# Patient Record
Sex: FEMALE | Race: WHITE | NOT HISPANIC OR LATINO | Employment: OTHER | URBAN - METROPOLITAN AREA
[De-identification: names, ages, dates, MRNs, and addresses within clinical notes are randomized per-mention and may not be internally consistent; named-entity substitution may affect disease eponyms.]

---

## 2017-08-09 ENCOUNTER — HISTORICAL (OUTPATIENT)
Dept: ADMINISTRATIVE | Facility: HOSPITAL | Age: 82
End: 2017-08-09

## 2019-03-21 ENCOUNTER — OFFICE VISIT (OUTPATIENT)
Dept: PRIMARY CARE CLINIC | Facility: CLINIC | Age: 84
End: 2019-03-21
Payer: MEDICARE

## 2019-03-21 VITALS
RESPIRATION RATE: 16 BRPM | WEIGHT: 178 LBS | BODY MASS INDEX: 34.95 KG/M2 | HEART RATE: 55 BPM | SYSTOLIC BLOOD PRESSURE: 138 MMHG | DIASTOLIC BLOOD PRESSURE: 68 MMHG | HEIGHT: 60 IN | OXYGEN SATURATION: 95 %

## 2019-03-21 DIAGNOSIS — M54.50 CHRONIC LOW BACK PAIN WITHOUT SCIATICA, UNSPECIFIED BACK PAIN LATERALITY: ICD-10-CM

## 2019-03-21 DIAGNOSIS — I10 HYPERTENSION, UNSPECIFIED TYPE: ICD-10-CM

## 2019-03-21 DIAGNOSIS — K21.9 GASTROESOPHAGEAL REFLUX DISEASE, ESOPHAGITIS PRESENCE NOT SPECIFIED: Primary | ICD-10-CM

## 2019-03-21 DIAGNOSIS — G89.29 CHRONIC LOW BACK PAIN WITHOUT SCIATICA, UNSPECIFIED BACK PAIN LATERALITY: ICD-10-CM

## 2019-03-21 PROCEDURE — 99214 OFFICE O/P EST MOD 30 MIN: CPT | Mod: S$GLB,,, | Performed by: FAMILY MEDICINE

## 2019-03-21 PROCEDURE — 99214 PR OFFICE/OUTPT VISIT, EST, LEVL IV, 30-39 MIN: ICD-10-PCS | Mod: S$GLB,,, | Performed by: FAMILY MEDICINE

## 2019-03-21 RX ORDER — OMEPRAZOLE 40 MG/1
40 CAPSULE, DELAYED RELEASE ORAL DAILY
Qty: 90 CAPSULE | Refills: 3 | Status: SHIPPED | OUTPATIENT
Start: 2019-03-21 | End: 2020-03-20

## 2019-03-21 RX ORDER — LOSARTAN POTASSIUM 100 MG/1
TABLET ORAL
COMMUNITY
Start: 2019-02-12 | End: 2019-03-21 | Stop reason: SDUPTHER

## 2019-03-21 RX ORDER — LOSARTAN POTASSIUM 100 MG/1
TABLET ORAL
Qty: 90 TABLET | Refills: 3 | Status: SHIPPED | OUTPATIENT
Start: 2019-03-21 | End: 2019-08-21 | Stop reason: SDUPTHER

## 2019-03-21 RX ORDER — ASPIRIN 81 MG/1
TABLET ORAL
COMMUNITY

## 2019-03-21 RX ORDER — GABAPENTIN 300 MG/1
300 CAPSULE ORAL 3 TIMES DAILY
COMMUNITY

## 2019-03-21 RX ORDER — AMLODIPINE BESYLATE 10 MG/1
10 TABLET ORAL DAILY
Qty: 90 TABLET | Refills: 3 | Status: SHIPPED | OUTPATIENT
Start: 2019-03-21 | End: 2019-08-21 | Stop reason: SDUPTHER

## 2019-03-21 RX ORDER — HYDROCODONE BITARTRATE AND ACETAMINOPHEN 5; 325 MG/1; MG/1
1 TABLET ORAL EVERY 6 HOURS PRN
Qty: 60 TABLET | Refills: 0 | Status: SHIPPED | OUTPATIENT
Start: 2019-03-21 | End: 2019-06-13 | Stop reason: SDUPTHER

## 2019-03-21 RX ORDER — BISOPROLOL FUMARATE AND HYDROCHLOROTHIAZIDE 10; 6.25 MG/1; MG/1
TABLET ORAL
COMMUNITY
Start: 2018-12-26 | End: 2019-03-21 | Stop reason: SDUPTHER

## 2019-03-21 RX ORDER — BISOPROLOL FUMARATE AND HYDROCHLOROTHIAZIDE 10; 6.25 MG/1; MG/1
TABLET ORAL
Qty: 180 TABLET | Refills: 3 | Status: SHIPPED | OUTPATIENT
Start: 2019-03-21 | End: 2019-08-21 | Stop reason: SDUPTHER

## 2019-03-21 RX ORDER — HYDROCODONE BITARTRATE AND ACETAMINOPHEN 5; 325 MG/1; MG/1
1 TABLET ORAL EVERY 6 HOURS PRN
COMMUNITY
End: 2019-03-21 | Stop reason: SDUPTHER

## 2019-03-21 RX ORDER — AMLODIPINE BESYLATE 10 MG/1
10 TABLET ORAL DAILY
Refills: 3 | COMMUNITY
Start: 2019-02-22 | End: 2019-03-21 | Stop reason: SDUPTHER

## 2019-03-21 RX ORDER — ALBUTEROL SULFATE 90 UG/1
AEROSOL, METERED RESPIRATORY (INHALATION)
COMMUNITY

## 2019-03-21 RX ORDER — LORAZEPAM 1 MG/1
TABLET ORAL
COMMUNITY

## 2019-03-21 RX ORDER — TIZANIDINE HYDROCHLORIDE 4 MG/1
CAPSULE, GELATIN COATED ORAL
COMMUNITY

## 2019-03-21 RX ORDER — FLUTICASONE FUROATE AND VILANTEROL 100; 25 UG/1; UG/1
POWDER RESPIRATORY (INHALATION)
COMMUNITY

## 2019-03-21 RX ORDER — HYDROCODONE BITARTRATE AND ACETAMINOPHEN 7.5; 325 MG/1; MG/1
TABLET ORAL
COMMUNITY
End: 2019-10-29

## 2019-03-21 NOTE — PROGRESS NOTES
Subjective:       Patient ID: Swati Martin is a 84 y.o. female.    Chief Complaint: Follow-up    Pt with htn.  She has had some cramps and edema.  Her bp has been good.  This occurred when her bisoprolol/hct was switched to generic and she also started taking it bid.    Also with stomach irritation.  She had black and bloody stool yesterday.  Hx of gastric ulcer a few years ago.  She is not on a med for this right now, b/c it hasn't been bothering her until she drank pickle juice for the mucsle cramps.  Also with chronic low back pain. She takes narcotics for this on and off.  She denies any side effects.  We have risks many times.    Review of Systems   Constitutional: Negative for chills, fatigue, fever and unexpected weight change.   Eyes: Negative for visual disturbance.   Respiratory: Negative for cough, shortness of breath and wheezing.    Cardiovascular: Negative for chest pain and palpitations.   Gastrointestinal: Positive for blood in stool and nausea. Negative for abdominal pain.        Heartburn   Genitourinary: Negative for difficulty urinating and dysuria.   Musculoskeletal: Positive for back pain.   Skin: Negative for rash.   Neurological: Negative for dizziness, syncope, light-headedness, numbness and headaches.   Hematological: Negative for adenopathy.   Psychiatric/Behavioral: Negative for dysphoric mood. The patient is not nervous/anxious.        Objective:      Physical Exam   Constitutional: She is oriented to person, place, and time. She appears well-developed and well-nourished.   obese   HENT:   Head: Normocephalic and atraumatic.   Eyes: Conjunctivae and EOM are normal.   Neck: Neck supple. No thyromegaly present.   Cardiovascular: Normal rate, regular rhythm and intact distal pulses.   No murmur heard.  Pulmonary/Chest: Effort normal and breath sounds normal.   Abdominal: Soft. Bowel sounds are normal. She exhibits no mass. There is no tenderness. There is no rebound and no guarding.    Musculoskeletal: Normal range of motion.   Neurological: She is alert and oriented to person, place, and time.   Skin: Skin is dry. No rash noted.   Psychiatric: She has a normal mood and affect.   Vitals reviewed.      Assessment:       1. Gastroesophageal reflux disease, esophagitis presence not specified    2. Hypertension, unspecified type    3. Chronic low back pain without sciatica, unspecified back pain laterality        Plan:       Gastroesophageal reflux disease, esophagitis presence not specified  -     omeprazole (PRILOSEC) 40 MG capsule; Take 1 capsule (40 mg total) by mouth once daily.  Dispense: 90 capsule; Refill: 3    Hypertension, unspecified type  -     amLODIPine (NORVASC) 10 MG tablet; Take 1 tablet (10 mg total) by mouth once daily. One tablet by mouth once daily  Dispense: 90 tablet; Refill: 3  -     bisoprolol-hydrochlorothiazide (ZIAC) 10-6.25 mg per tablet; One tablet by mouth twice daily  Dispense: 180 tablet; Refill: 3  -     losartan (COZAAR) 100 MG tablet; One tablet by mouth once daily  Dispense: 90 tablet; Refill: 3    Chronic low back pain without sciatica, unspecified back pain laterality  -     HYDROcodone-acetaminophen (NORCO) 5-325 mg per tablet; Take 1 tablet by mouth every 6 (six) hours as needed for Pain.  Dispense: 60 tablet; Refill: 0              DISCUSSION:  Try decreasing the dose to qd on the bisoprolol/hct and monitor sx's and bp.  Continue to be very cautious with the narcotics.  Start the omeprazole and monitor your stools.  Go to ER if worsens acutely.  Also go to ER if any stomach pain develops.

## 2019-04-05 PROBLEM — K57.30 DIVERTICULAR DISEASE OF COLON: Status: ACTIVE | Noted: 2019-04-05

## 2019-04-05 PROBLEM — D12.6 BENIGN NEOPLASM OF COLON: Status: ACTIVE | Noted: 2019-04-05

## 2019-04-05 PROBLEM — J44.9 CHRONIC OBSTRUCTIVE LUNG DISEASE: Status: ACTIVE | Noted: 2019-04-05

## 2019-04-05 PROBLEM — R74.01 ELEVATED LEVELS OF TRANSAMINASE & LACTIC ACID DEHYDROGENASE: Status: ACTIVE | Noted: 2019-04-05

## 2019-04-05 PROBLEM — M81.0 OSTEOPOROSIS: Status: ACTIVE | Noted: 2019-04-05

## 2019-04-05 PROBLEM — F41.1 ANXIETY STATE: Status: ACTIVE | Noted: 2019-04-05

## 2019-04-05 PROBLEM — I70.91 GENERALIZED ATHEROSCLEROSIS: Status: ACTIVE | Noted: 2019-04-05

## 2019-04-05 PROBLEM — R01.1 HEART MURMUR: Status: ACTIVE | Noted: 2019-04-05

## 2019-04-05 PROBLEM — I65.29 CAROTID ARTERY OCCLUSION: Status: ACTIVE | Noted: 2019-04-05

## 2019-04-05 PROBLEM — I10 BENIGN ESSENTIAL HYPERTENSION: Status: ACTIVE | Noted: 2019-04-05

## 2019-04-05 PROBLEM — R16.1 SPLENOMEGALY: Status: ACTIVE | Noted: 2019-04-05

## 2019-04-05 PROBLEM — R74.02 ELEVATED LEVELS OF TRANSAMINASE & LACTIC ACID DEHYDROGENASE: Status: ACTIVE | Noted: 2019-04-05

## 2019-04-05 PROBLEM — M54.50 LOW BACK PAIN: Status: ACTIVE | Noted: 2019-04-05

## 2019-04-05 PROBLEM — I10 HYPERTENSIVE DISORDER: Status: ACTIVE | Noted: 2019-04-05

## 2019-04-05 PROBLEM — F32.A DEPRESSIVE DISORDER: Status: ACTIVE | Noted: 2019-04-05

## 2019-04-05 PROBLEM — M19.90 OSTEOARTHRITIS: Status: ACTIVE | Noted: 2019-04-05

## 2019-06-13 DIAGNOSIS — G89.29 CHRONIC LOW BACK PAIN WITHOUT SCIATICA, UNSPECIFIED BACK PAIN LATERALITY: ICD-10-CM

## 2019-06-13 DIAGNOSIS — M54.50 CHRONIC LOW BACK PAIN WITHOUT SCIATICA, UNSPECIFIED BACK PAIN LATERALITY: ICD-10-CM

## 2019-06-13 NOTE — TELEPHONE ENCOUNTER
Pt called and also came to office stating she is needing a refill on her Hydrocodone/APAP 5/325MG tab - 1 P O Q 6 hours prn pain. Pt currently only has 2 left.   trop negative x 2, patient continues to feels well, denies recurrent pain. Will follow up with PMD and with pulm Dr. Teresa for chronic dyspnea.

## 2019-06-17 RX ORDER — HYDROCODONE BITARTRATE AND ACETAMINOPHEN 5; 325 MG/1; MG/1
1 TABLET ORAL EVERY 6 HOURS PRN
Qty: 60 TABLET | Refills: 0 | Status: SHIPPED | OUTPATIENT
Start: 2019-06-17 | End: 2019-08-21 | Stop reason: SDUPTHER

## 2019-08-21 ENCOUNTER — OFFICE VISIT (OUTPATIENT)
Dept: PRIMARY CARE CLINIC | Facility: CLINIC | Age: 84
End: 2019-08-21
Payer: MEDICARE

## 2019-08-21 VITALS
OXYGEN SATURATION: 95 % | RESPIRATION RATE: 14 BRPM | BODY MASS INDEX: 35.35 KG/M2 | DIASTOLIC BLOOD PRESSURE: 60 MMHG | SYSTOLIC BLOOD PRESSURE: 160 MMHG | WEIGHT: 181 LBS | HEART RATE: 65 BPM

## 2019-08-21 DIAGNOSIS — I65.29 OCCLUSION OF CAROTID ARTERY, UNSPECIFIED LATERALITY: ICD-10-CM

## 2019-08-21 DIAGNOSIS — M54.50 CHRONIC LOW BACK PAIN WITHOUT SCIATICA, UNSPECIFIED BACK PAIN LATERALITY: ICD-10-CM

## 2019-08-21 DIAGNOSIS — M54.50 CHRONIC BILATERAL LOW BACK PAIN WITHOUT SCIATICA: ICD-10-CM

## 2019-08-21 DIAGNOSIS — F41.1 ANXIETY STATE: ICD-10-CM

## 2019-08-21 DIAGNOSIS — J44.9 CHRONIC OBSTRUCTIVE PULMONARY DISEASE, UNSPECIFIED COPD TYPE: ICD-10-CM

## 2019-08-21 DIAGNOSIS — I10 HYPERTENSION, UNSPECIFIED TYPE: ICD-10-CM

## 2019-08-21 DIAGNOSIS — I10 BENIGN ESSENTIAL HYPERTENSION: Primary | ICD-10-CM

## 2019-08-21 DIAGNOSIS — G89.29 CHRONIC LOW BACK PAIN WITHOUT SCIATICA, UNSPECIFIED BACK PAIN LATERALITY: ICD-10-CM

## 2019-08-21 DIAGNOSIS — H53.9 VISION DISTURBANCE: ICD-10-CM

## 2019-08-21 DIAGNOSIS — G89.29 CHRONIC BILATERAL LOW BACK PAIN WITHOUT SCIATICA: ICD-10-CM

## 2019-08-21 PROCEDURE — 99214 OFFICE O/P EST MOD 30 MIN: CPT | Mod: S$GLB,,, | Performed by: FAMILY MEDICINE

## 2019-08-21 PROCEDURE — 99214 PR OFFICE/OUTPT VISIT, EST, LEVL IV, 30-39 MIN: ICD-10-PCS | Mod: S$GLB,,, | Performed by: FAMILY MEDICINE

## 2019-08-21 RX ORDER — FLUTICASONE FUROATE AND VILANTEROL 100; 25 UG/1; UG/1
1 POWDER RESPIRATORY (INHALATION) DAILY
COMMUNITY

## 2019-08-21 RX ORDER — LOSARTAN POTASSIUM 100 MG/1
TABLET ORAL
Qty: 90 TABLET | Refills: 3 | Status: SHIPPED | OUTPATIENT
Start: 2019-08-21

## 2019-08-21 RX ORDER — CHOLECALCIFEROL (VITAMIN D3) 25 MCG
1000 TABLET ORAL DAILY
COMMUNITY

## 2019-08-21 RX ORDER — AMLODIPINE BESYLATE 10 MG/1
10 TABLET ORAL DAILY
Qty: 90 TABLET | Refills: 3 | Status: SHIPPED | OUTPATIENT
Start: 2019-08-21

## 2019-08-21 RX ORDER — HYDROCODONE BITARTRATE AND ACETAMINOPHEN 5; 325 MG/1; MG/1
1 TABLET ORAL EVERY 6 HOURS PRN
Qty: 60 TABLET | Refills: 0 | Status: SHIPPED | OUTPATIENT
Start: 2019-08-21 | End: 2019-10-29 | Stop reason: SDUPTHER

## 2019-08-21 RX ORDER — AMOXICILLIN 500 MG
CAPSULE ORAL DAILY
COMMUNITY

## 2019-08-21 RX ORDER — BISOPROLOL FUMARATE AND HYDROCHLOROTHIAZIDE 10; 6.25 MG/1; MG/1
TABLET ORAL
Qty: 180 TABLET | Refills: 3 | Status: SHIPPED | OUTPATIENT
Start: 2019-08-21

## 2019-08-21 NOTE — PROGRESS NOTES
"Subjective:       Patient ID: Swati Martin is a 84 y.o. female.    Chief Complaint: Follow-up (6 month check up. She is having pains on her left side in the lower back area. She has had this for " a few days." She believes it is a pulled muscle ) and Medication Refill      Also with vision disturbance.  Mostly in the left eye.  She has had cataracts done in the past.    Also with stress with grandson with cancer at 1 yo.    Also with left flank pain.  No trauma recalled.  X 3 days.  Worse today.  Worse with twisting.  Feels like a muscle.      Benign essential hypertension hx.  Home values are good.  Recent cardio check up and echo was good.  It is very high here today.  She has no symptoms of this.    Chronic bilateral low back pain without sciatica    Chronic obstructive pulmonary disease:  This had been stable for while until a few months ago it began to worsen.  She had stopped her Breo because she was doing so well.  She has restarted this and it has been better.  Anxiety is a little worse when she has to drive now.    '    Review of Systems   Constitutional: Negative for chills, fatigue, fever and unexpected weight change.   Eyes: Negative for visual disturbance.   Respiratory: Negative for cough, shortness of breath and wheezing.    Cardiovascular: Negative for chest pain and palpitations.   Gastrointestinal: Negative for abdominal pain and blood in stool.   Genitourinary: Negative for difficulty urinating and dysuria.   Musculoskeletal: Positive for back pain (left flank).   Skin: Negative for rash.   Neurological: Negative for dizziness, syncope, light-headedness, numbness and headaches.   Hematological: Negative for adenopathy.   Psychiatric/Behavioral: Negative for dysphoric mood. The patient is not nervous/anxious.      Medication List with Changes/Refills   Current Medications    ALBUTEROL (PROVENTIL/VENTOLIN HFA) 90 MCG/ACTUATION INHALER    ProAir HFA 90 mcg/actuation aerosol inhaler    ASPIRIN " (ECOTRIN) 81 MG EC TABLET    One tablet by mouth once daily    CALCIUM CARBONATE (CALCIUM ANTACID) 300 MG (750 MG) CHEW    Take by mouth.    FISH OIL-OMEGA-3 FATTY ACIDS 300-1,000 MG CAPSULE    Take by mouth once daily.    FLUTICASONE FUROATE-VILANTEROL (BREO ELLIPTA) 100-25 MCG/DOSE DISKUS INHALER    Inhale 1 puff into the lungs once daily. Controller    FLUTICASONE-VILANTEROL (BREO) 100-25 MCG/DOSE DISKUS INHALER    Breo Ellipta 100 mcg-25 mcg/dose powder for inhalation    GABAPENTIN (NEURONTIN) 300 MG CAPSULE    gabapentin 300 mg capsule    HYDROCODONE-ACETAMINOPHEN (NORCO) 7.5-325 MG PER TABLET    hydrocodone 7.5 mg-acetaminophen 325 mg tablet    LORAZEPAM (ATIVAN) 1 MG TABLET    One tablet by mouth twice daily    OMEPRAZOLE (PRILOSEC) 40 MG CAPSULE    Take 1 capsule (40 mg total) by mouth once daily.    TIZANIDINE 4 MG CAP    One tablet by mouth once daily    VITAMIN D (VITAMIN D3) 1000 UNITS TAB    Take 1,000 Units by mouth once daily.   Changed and/or Refilled Medications    Modified Medication Previous Medication    AMLODIPINE (NORVASC) 10 MG TABLET amLODIPine (NORVASC) 10 MG tablet       Take 1 tablet (10 mg total) by mouth once daily. One tablet by mouth once daily    Take 1 tablet (10 mg total) by mouth once daily. One tablet by mouth once daily    BISOPROLOL-HYDROCHLOROTHIAZIDE (ZIAC) 10-6.25 MG PER TABLET bisoprolol-hydrochlorothiazide (ZIAC) 10-6.25 mg per tablet       One tablet by mouth twice daily    One tablet by mouth twice daily    HYDROCODONE-ACETAMINOPHEN (NORCO) 5-325 MG PER TABLET HYDROcodone-acetaminophen (NORCO) 5-325 mg per tablet       Take 1 tablet by mouth every 6 (six) hours as needed for Pain. Medically necessary    Take 1 tablet by mouth every 6 (six) hours as needed for Pain.    LOSARTAN (COZAAR) 100 MG TABLET losartan (COZAAR) 100 MG tablet       One tablet by mouth once daily    One tablet by mouth once daily         Objective:      BP (!) 160/60   Pulse 65   Resp 14   Wt 82.1  kg (181 lb)   SpO2 95%   BMI 35.35 kg/m²   Estimated body mass index is 35.35 kg/m² as calculated from the following:    Height as of 3/21/19: 5' (1.524 m).    Weight as of this encounter: 82.1 kg (181 lb).  Physical Exam   Constitutional: She is oriented to person, place, and time. She appears well-developed and well-nourished.   obese   HENT:   Head: Normocephalic and atraumatic.   Eyes: Conjunctivae and EOM are normal.   Neck: Neck supple. No thyromegaly present.   Cardiovascular: Normal rate, regular rhythm and intact distal pulses.   Murmur heard.  Pulmonary/Chest: Effort normal and breath sounds normal.   Abdominal: Soft. Bowel sounds are normal. She exhibits no mass. There is no tenderness. There is no rebound and no guarding.   Musculoskeletal: Normal range of motion.   Neurological: She is alert and oriented to person, place, and time.   Skin: Skin is dry. No rash noted.   Psychiatric: She has a normal mood and affect. Her behavior is normal.   Vitals reviewed.      Assessment:       Problem List Items Addressed This Visit        Psychiatric    Anxiety state       Pulmonary    Chronic obstructive lung disease       Cardiac/Vascular    Benign essential hypertension - Primary    Relevant Orders    CBC auto differential    Comprehensive metabolic panel    Carotid artery occlusion    Hypertensive disorder    Relevant Medications    amLODIPine (NORVASC) 10 MG tablet    bisoprolol-hydrochlorothiazide (ZIAC) 10-6.25 mg per tablet    losartan (COZAAR) 100 MG tablet       Orthopedic    Low back pain    Relevant Medications    HYDROcodone-acetaminophen (NORCO) 5-325 mg per tablet      Other Visit Diagnoses     Vision disturbance        Relevant Orders    Ambulatory referral to Ophthalmology            Plan:       Benign essential hypertension  -     CBC auto differential; Future; Expected date: 08/21/2019  -     Comprehensive metabolic panel; Future; Expected date: 08/21/2019    Chronic bilateral low back pain  without sciatica    Chronic obstructive pulmonary disease, unspecified COPD type    Anxiety state    Occlusion of carotid artery, unspecified laterality    Vision disturbance  -     Cancel: Ambulatory referral to Ophthalmology  -     Ambulatory referral to Ophthalmology    Hypertension, unspecified type  -     amLODIPine (NORVASC) 10 MG tablet; Take 1 tablet (10 mg total) by mouth once daily. One tablet by mouth once daily  Dispense: 90 tablet; Refill: 3  -     bisoprolol-hydrochlorothiazide (ZIAC) 10-6.25 mg per tablet; One tablet by mouth twice daily  Dispense: 180 tablet; Refill: 3  -     losartan (COZAAR) 100 MG tablet; One tablet by mouth once daily  Dispense: 90 tablet; Refill: 3    Chronic low back pain without sciatica, unspecified back pain laterality  -     HYDROcodone-acetaminophen (NORCO) 5-325 mg per tablet; Take 1 tablet by mouth every 6 (six) hours as needed for Pain. Medically necessary  Dispense: 60 tablet; Refill: 0              DISCUSSION:  Monitor bp and send to us in a week.  Ice and rest for her back.  Cont the breo.  Cont all other meds.  Refer to eye

## 2019-08-22 LAB
ALBUMIN SERPL-MCNC: 4 G/DL (ref 3.6–5.1)
ALBUMIN/GLOB SERPL: 1.8 (CALC) (ref 1–2.5)
ALP SERPL-CCNC: 97 U/L (ref 33–130)
ALT SERPL-CCNC: 64 U/L (ref 6–29)
AST SERPL-CCNC: 58 U/L (ref 10–35)
BASOPHILS # BLD AUTO: 152 CELLS/UL (ref 0–200)
BASOPHILS NFR BLD AUTO: 1.6 %
BILIRUB SERPL-MCNC: 0.7 MG/DL (ref 0.2–1.2)
BUN SERPL-MCNC: 19 MG/DL (ref 7–25)
BUN/CREAT SERPL: 36 (CALC) (ref 6–22)
CALCIUM SERPL-MCNC: 9.9 MG/DL (ref 8.6–10.4)
CHLORIDE SERPL-SCNC: 104 MMOL/L (ref 98–110)
CO2 SERPL-SCNC: 28 MMOL/L (ref 20–32)
CREAT SERPL-MCNC: 0.53 MG/DL (ref 0.6–0.88)
EOSINOPHIL # BLD AUTO: 219 CELLS/UL (ref 15–500)
EOSINOPHIL NFR BLD AUTO: 2.3 %
ERYTHROCYTE [DISTWIDTH] IN BLOOD BY AUTOMATED COUNT: 17.5 % (ref 11–15)
GFRSERPLBLD MDRD-ARVRAT: 88 ML/MIN/1.73M2
GLOBULIN SER CALC-MCNC: 2.2 G/DL (CALC) (ref 1.9–3.7)
GLUCOSE SERPL-MCNC: 102 MG/DL (ref 65–99)
HCT VFR BLD AUTO: 59.9 % (ref 35–45)
HGB BLD-MCNC: 19.4 G/DL (ref 11.7–15.5)
LYMPHOCYTES # BLD AUTO: 1235 CELLS/UL (ref 850–3900)
LYMPHOCYTES NFR BLD AUTO: 13 %
MCH RBC QN AUTO: 26.6 PG (ref 27–33)
MCHC RBC AUTO-ENTMCNC: 32.4 G/DL (ref 32–36)
MCV RBC AUTO: 82.2 FL (ref 80–100)
MONOCYTES # BLD AUTO: 684 CELLS/UL (ref 200–950)
MONOCYTES NFR BLD AUTO: 7.2 %
NEUTROPHILS # BLD AUTO: 7211 CELLS/UL (ref 1500–7800)
NEUTROPHILS NFR BLD AUTO: 75.9 %
PLATELET # BLD AUTO: 574 THOUSAND/UL (ref 140–400)
PMV BLD REES-ECKER: 10.3 FL (ref 7.5–12.5)
POTASSIUM SERPL-SCNC: 5.4 MMOL/L (ref 3.5–5.3)
PROT SERPL-MCNC: 6.2 G/DL (ref 6.1–8.1)
RBC # BLD AUTO: 7.29 MILLION/UL (ref 3.8–5.1)
SODIUM SERPL-SCNC: 142 MMOL/L (ref 135–146)
WBC # BLD AUTO: 9.5 THOUSAND/UL (ref 3.8–10.8)

## 2019-08-26 ENCOUNTER — TELEPHONE (OUTPATIENT)
Dept: PRIMARY CARE CLINIC | Facility: CLINIC | Age: 84
End: 2019-08-26

## 2019-08-26 NOTE — TELEPHONE ENCOUNTER
She is going to see her oncologist in October. Her BP yesterday morning was 144/70 and this morning  138/69

## 2019-08-26 NOTE — TELEPHONE ENCOUNTER
----- Message from Rangel Freitas MD sent at 8/26/2019 12:28 PM CDT -----  Please let her know that her blood work is stable.  Please have her continue to follow up with her specialist.  Also find out when was the last time she saw her oncologist.

## 2019-10-29 ENCOUNTER — TELEPHONE (OUTPATIENT)
Dept: PRIMARY CARE CLINIC | Facility: CLINIC | Age: 84
End: 2019-10-29

## 2019-10-29 DIAGNOSIS — G89.29 CHRONIC LOW BACK PAIN WITHOUT SCIATICA, UNSPECIFIED BACK PAIN LATERALITY: ICD-10-CM

## 2019-10-29 DIAGNOSIS — M54.50 CHRONIC LOW BACK PAIN WITHOUT SCIATICA, UNSPECIFIED BACK PAIN LATERALITY: ICD-10-CM

## 2019-10-29 RX ORDER — HYDROCODONE BITARTRATE AND ACETAMINOPHEN 5; 325 MG/1; MG/1
1 TABLET ORAL EVERY 6 HOURS PRN
Qty: 60 TABLET | Refills: 0 | Status: SHIPPED | OUTPATIENT
Start: 2019-10-29 | End: 2020-01-16 | Stop reason: SDUPTHER

## 2019-10-29 NOTE — TELEPHONE ENCOUNTER
----- Message from Cherry Nuno sent at 10/29/2019 10:22 AM CDT -----  Contact: Patient   Patient came in today asking for a written script for her HYDROcodone-acetaminophen (NORCO) 5-325 mg per tablet. Said she wanted to stop back by later to pick it up.

## 2019-12-06 ENCOUNTER — TELEPHONE (OUTPATIENT)
Dept: PRIMARY CARE CLINIC | Facility: CLINIC | Age: 84
End: 2019-12-06

## 2019-12-06 NOTE — TELEPHONE ENCOUNTER
"Called to let us know Mrs. Longoria " was in the hospital. They want you to fill Losartan 50 mg and not the 100 mg."      Pharmacy is [facility]. Records have been requested from the hospital to ensure the dose.   "

## 2019-12-24 ENCOUNTER — TELEPHONE (OUTPATIENT)
Dept: PRIMARY CARE CLINIC | Facility: CLINIC | Age: 84
End: 2019-12-24

## 2019-12-24 NOTE — TELEPHONE ENCOUNTER
----- Message from August Rush sent at 12/13/2019  4:39 PM CST -----  Contact: Daughter  Daughter is Sia.    Spoke to Fide and appt needs to be scheduled for Monday January 13, 2020 at 11:00.  I am not able to do this.    Daughter is already aware of this appt and time.

## 2020-01-13 ENCOUNTER — TELEPHONE (OUTPATIENT)
Dept: PRIMARY CARE CLINIC | Facility: CLINIC | Age: 85
End: 2020-01-13

## 2020-01-13 NOTE — TELEPHONE ENCOUNTER
----- Message from Cherry Nuno sent at 1/13/2020  9:07 AM CST -----  Contact: Pt   Pt said she can come at 11:30 Thursday 1/16

## 2020-01-16 ENCOUNTER — OFFICE VISIT (OUTPATIENT)
Dept: PRIMARY CARE CLINIC | Facility: CLINIC | Age: 85
End: 2020-01-16
Payer: MEDICARE

## 2020-01-16 VITALS
HEIGHT: 59 IN | OXYGEN SATURATION: 90 % | SYSTOLIC BLOOD PRESSURE: 126 MMHG | RESPIRATION RATE: 14 BRPM | HEART RATE: 77 BPM | DIASTOLIC BLOOD PRESSURE: 72 MMHG | BODY MASS INDEX: 36.49 KG/M2 | WEIGHT: 181 LBS

## 2020-01-16 DIAGNOSIS — M54.50 CHRONIC LOW BACK PAIN WITHOUT SCIATICA, UNSPECIFIED BACK PAIN LATERALITY: ICD-10-CM

## 2020-01-16 DIAGNOSIS — G89.29 CHRONIC LOW BACK PAIN WITHOUT SCIATICA, UNSPECIFIED BACK PAIN LATERALITY: ICD-10-CM

## 2020-01-16 DIAGNOSIS — R16.1 SPLENOMEGALY: Primary | ICD-10-CM

## 2020-01-16 PROCEDURE — 1159F MED LIST DOCD IN RCRD: CPT | Mod: S$GLB,,, | Performed by: FAMILY MEDICINE

## 2020-01-16 PROCEDURE — 1159F PR MEDICATION LIST DOCUMENTED IN MEDICAL RECORD: ICD-10-PCS | Mod: S$GLB,,, | Performed by: FAMILY MEDICINE

## 2020-01-16 PROCEDURE — 99214 PR OFFICE/OUTPT VISIT, EST, LEVL IV, 30-39 MIN: ICD-10-PCS | Mod: S$GLB,,, | Performed by: FAMILY MEDICINE

## 2020-01-16 PROCEDURE — 99214 OFFICE O/P EST MOD 30 MIN: CPT | Mod: S$GLB,,, | Performed by: FAMILY MEDICINE

## 2020-01-16 RX ORDER — LIDOCAINE 50 MG/G
1 PATCH TOPICAL DAILY
Qty: 30 PATCH | Refills: 3 | Status: SHIPPED | OUTPATIENT
Start: 2020-01-16

## 2020-01-16 RX ORDER — FLUTICASONE FUROATE AND VILANTEROL 100; 25 UG/1; UG/1
1 POWDER RESPIRATORY (INHALATION) DAILY
COMMUNITY

## 2020-01-16 RX ORDER — METOLAZONE 10 MG/1
10 TABLET ORAL DAILY
COMMUNITY
Start: 2020-01-03

## 2020-01-16 RX ORDER — AMIODARONE HYDROCHLORIDE 200 MG/1
400 TABLET ORAL 2 TIMES DAILY
Refills: 0 | COMMUNITY
Start: 2019-12-05

## 2020-01-16 RX ORDER — TORSEMIDE 20 MG/1
40 TABLET ORAL DAILY
COMMUNITY
Start: 2019-12-24

## 2020-01-16 RX ORDER — ARFORMOTEROL TARTRATE 15 UG/2ML
15 SOLUTION RESPIRATORY (INHALATION) 2 TIMES DAILY
COMMUNITY

## 2020-01-16 RX ORDER — DILTIAZEM HYDROCHLORIDE 240 MG/1
240 CAPSULE, COATED, EXTENDED RELEASE ORAL DAILY
COMMUNITY
Start: 2019-12-30

## 2020-01-16 RX ORDER — APIXABAN 5 MG/1
5 TABLET, FILM COATED ORAL 2 TIMES DAILY
Refills: 0 | COMMUNITY
Start: 2019-12-05

## 2020-01-16 RX ORDER — LIDOCAINE 50 MG/G
1 PATCH TOPICAL DAILY
Qty: 30 PATCH | Refills: 3 | Status: SHIPPED | OUTPATIENT
Start: 2020-01-16 | End: 2020-01-16

## 2020-01-16 RX ORDER — PREDNISONE 20 MG/1
40 TABLET ORAL 2 TIMES DAILY
Refills: 0 | COMMUNITY
Start: 2019-11-27

## 2020-01-16 RX ORDER — METOPROLOL TARTRATE 50 MG/1
50 TABLET ORAL 2 TIMES DAILY
COMMUNITY
Start: 2019-12-30

## 2020-01-16 RX ORDER — HYDROCODONE BITARTRATE AND ACETAMINOPHEN 5; 325 MG/1; MG/1
1 TABLET ORAL EVERY 6 HOURS PRN
Qty: 60 TABLET | Refills: 0 | Status: SHIPPED | OUTPATIENT
Start: 2020-01-16

## 2020-01-16 RX ORDER — FUROSEMIDE 20 MG/1
TABLET ORAL
Refills: 1 | COMMUNITY
Start: 2019-11-27

## 2020-01-16 RX ORDER — MINOCYCLINE HYDROCHLORIDE 100 MG/1
100 TABLET ORAL 2 TIMES DAILY
Refills: 0 | COMMUNITY
Start: 2019-11-27

## 2020-01-16 RX ORDER — HYDROXYUREA 500 MG/1
500 CAPSULE ORAL DAILY
Refills: 0 | COMMUNITY
Start: 2019-12-05

## 2020-01-16 RX ORDER — THEOPHYLLINE 300 MG/1
300 TABLET, EXTENDED RELEASE ORAL EVERY 12 HOURS
Refills: 0 | COMMUNITY
Start: 2019-11-27

## 2020-01-16 NOTE — LETTER
Fax Transmission                                                                                                                                                       Date: January 16, 2020       To:  [facility] medical records From:     [phone] Fax:    Phone:   Phone:      Special Instructions:     Please send Swati Martin ( 1935) most recent hospital discharge                           If there are any problems with this transmission, please call immediately. Thank you.    Confidentiality notice: The accompanying facsimile is intended solely for the use of the recipient designated above. Document(s) transmitted herewith may contain information that is confidential and privileged. Delivery, distribution or dissemination of this communication other than to the intended recipient is strictly prohibited. If you have received this facsimile in error, please notify [facility]'s Corporate Integrity Department immediately by telephone at [phone].

## 2020-01-16 NOTE — LETTER
Fax Transmission                                                                                                                                                       Date: January 16, 2020       To:  Dr. Medina  From:    Fax:  [phone] Fax: [phone]   Phone:   Phone:      Special Instructions:     Please send Swati Martin (1935) most recent office visit and labs. Thank you                          If there are any problems with this transmission, please call immediately. Thank you.    Confidentiality notice: The accompanying facsimile is intended solely for the use of the recipient designated above. Document(s) transmitted herewith may contain information that is confidential and privileged. Delivery, distribution or dissemination of this communication other than to the intended recipient is strictly prohibited. If you have received this facsimile in error, please notify [facility]'s Corporate Integrity Department immediately by telephone at [phone].

## 2020-01-16 NOTE — PROGRESS NOTES
"Subjective:       Patient ID: Swati Martin is a 84 y.o. female.    Chief Complaint: Follow-up    Patient presents for hospital follow-up from a month and half ago.  The history is confusing but she evidently was very ill and had multiple tests including heart catheterization.  They changed her medications around.  They added many.  She is upset about this.  Overall she is doing much better now than she was, though the family member with her now states that she is still weak tired and evidently there has been some confusion which has lingered.  She was given oxygen but she forgets to wear it often.  She is seeing Dr. Aj the pulmonologist and Dr. Bennett for a newly diagnosis splenomegaly.  She has some form of cancer".    Review of Systems   Constitutional: Positive for fatigue. Negative for chills, fever and unexpected weight change.   Eyes: Negative for visual disturbance.   Respiratory: Positive for cough and shortness of breath. Negative for wheezing.    Cardiovascular: Negative for chest pain and palpitations.   Gastrointestinal: Negative for abdominal pain and blood in stool.   Genitourinary: Negative for difficulty urinating and dysuria.   Musculoskeletal: Positive for back pain.   Skin: Negative for rash.   Neurological: Negative for dizziness, syncope, light-headedness, numbness and headaches.   Hematological: Negative for adenopathy.   Psychiatric/Behavioral: Positive for confusion. Negative for dysphoric mood. The patient is nervous/anxious.      Medication List with Changes/Refills   New Medications    LIDOCAINE (LIDODERM) 5 %    Place 1 patch onto the skin once daily. Remove & Discard patch within 12 hours or as directed by MD, prn   Current Medications    ALBUTEROL (PROVENTIL/VENTOLIN HFA) 90 MCG/ACTUATION INHALER    ProAir HFA 90 mcg/actuation aerosol inhaler    AMIODARONE (PACERONE) 200 MG TAB    Take 400 mg by mouth 2 (two) times daily.    AMLODIPINE (NORVASC) 10 MG TABLET    Take 1 " tablet (10 mg total) by mouth once daily. One tablet by mouth once daily    ARFORMOTEROL (BROVANA) 15 MCG/2 ML NEBU    Take 15 mcg by nebulization 2 (two) times daily. Controller    ASPIRIN (ECOTRIN) 81 MG EC TABLET    One tablet by mouth once daily    BISOPROLOL-HYDROCHLOROTHIAZIDE (ZIAC) 10-6.25 MG PER TABLET    One tablet by mouth twice daily    CALCIUM CARBONATE (CALCIUM ANTACID) 300 MG (750 MG) CHEW    Take by mouth.    DILTIAZEM (CARDIZEM CD) 240 MG 24 HR CAPSULE    Take 240 mg by mouth once daily.    ELIQUIS 5 MG TAB    Take 5 mg by mouth 2 (two) times daily.    FISH OIL-OMEGA-3 FATTY ACIDS 300-1,000 MG CAPSULE    Take by mouth once daily.    FLUTICASONE FUROATE-VILANTEROL (BREO ELLIPTA) 100-25 MCG/DOSE DISKUS INHALER    Inhale 1 puff into the lungs once daily. Controller    FLUTICASONE FUROATE-VILANTEROL (BREO ELLIPTA) 100-25 MCG/DOSE DISKUS INHALER    Inhale 1 puff into the lungs once daily. Controller    FLUTICASONE-VILANTEROL (BREO) 100-25 MCG/DOSE DISKUS INHALER    Breo Ellipta 100 mcg-25 mcg/dose powder for inhalation    FUROSEMIDE (LASIX) 20 MG TABLET    TAKE 1 TABLET BY MOUTH ONCE DAILY AS NEEDED FOR SWELLING OR SHORTNESS OF BREATH    GABAPENTIN (NEURONTIN) 300 MG CAPSULE    300 mg 3 (three) times daily.     HYDROXYUREA (HYDREA) 500 MG CAP    Take 500 mg by mouth once daily .    LORAZEPAM (ATIVAN) 1 MG TABLET    One tablet by mouth twice daily    LOSARTAN (COZAAR) 100 MG TABLET    One tablet by mouth once daily    METOLAZONE (ZAROXOLYN) 10 MG TABLET    Take 10 mg by mouth once daily.    METOPROLOL TARTRATE (LOPRESSOR) 50 MG TABLET    Take 50 mg by mouth 2 (two) times daily.    MINOCYCLINE (DYNACIN) 100 MG TABLET    Take 100 mg by mouth 2 (two) times daily.    OMEPRAZOLE (PRILOSEC) 40 MG CAPSULE    Take 1 capsule (40 mg total) by mouth once daily.    PREDNISONE (DELTASONE) 20 MG TABLET    Take 40 mg by mouth 2 (two) times daily.    REVEFENACIN (YUPELRI) 175 MCG/3 ML NEBU    Inhale into the lungs.     "THEOPHYLLINE (THEODUR) 300 MG 12 HR TABLET    Take 300 mg by mouth every 12 (twelve) hours.    TIZANIDINE 4 MG CAP    One tablet by mouth once daily    TORSEMIDE (DEMADEX) 20 MG TAB    Take 40 mg by mouth once daily.     VITAMIN D (VITAMIN D3) 1000 UNITS TAB    Take 1,000 Units by mouth once daily.   Changed and/or Refilled Medications    Modified Medication Previous Medication    HYDROCODONE-ACETAMINOPHEN (NORCO) 5-325 MG PER TABLET HYDROcodone-acetaminophen (NORCO) 5-325 mg per tablet       Take 1 tablet by mouth every 6 (six) hours as needed for Pain. Medically necessary    Take 1 tablet by mouth every 6 (six) hours as needed for Pain. Medically necessary         Objective:      /72   Pulse 77   Resp 14   Ht 4' 11" (1.499 m)   Wt 82.1 kg (181 lb)   LMP  (LMP Unknown)   SpO2 (!) 90%   BMI 36.56 kg/m²   Estimated body mass index is 36.56 kg/m² as calculated from the following:    Height as of this encounter: 4' 11" (1.499 m).    Weight as of this encounter: 82.1 kg (181 lb).  Physical Exam   Constitutional: She is oriented to person, place, and time. She appears well-developed and well-nourished.   Overall she does look more chronically ill than her last exam.  Her cognition is mildly decreased from her previous exam as well.   HENT:   Head: Normocephalic and atraumatic.   Eyes: Conjunctivae and EOM are normal.   Neck: Neck supple. No thyromegaly present.   Cardiovascular: Normal rate, regular rhythm and intact distal pulses.   No murmur heard.  Pulmonary/Chest: Effort normal and breath sounds normal.   Abdominal: Soft. Bowel sounds are normal. She exhibits no mass. There is no tenderness. There is no rebound and no guarding.   Musculoskeletal: Normal range of motion.   Neurological: She is alert and oriented to person, place, and time.   Skin: Skin is dry. No rash noted.   Psychiatric: She has a normal mood and affect.   Vitals reviewed.      Assessment:       Problem List Items Addressed This Visit  "       GI    Splenomegaly - Primary       Orthopedic    Low back pain    Relevant Medications    HYDROcodone-acetaminophen (NORCO) 5-325 mg per tablet    lidocaine (LIDODERM) 5 %            Plan:       Splenomegaly    Chronic low back pain without sciatica, unspecified back pain laterality  -     HYDROcodone-acetaminophen (NORCO) 5-325 mg per tablet; Take 1 tablet by mouth every 6 (six) hours as needed for Pain. Medically necessary  Dispense: 60 tablet; Refill: 0  -     Discontinue: lidocaine (LIDODERM) 5 %; Place 1 patch onto the skin once daily. PRN, Remove & Discard patch within 12 hours or as directed by MD  Dispense: 30 patch; Refill: 3  -     lidocaine (LIDODERM) 5 %; Place 1 patch onto the skin once daily. Remove & Discard patch within 12 hours or as directed by MD, prn  Dispense: 30 patch; Refill: 3              DISCUSSION:  I need to get the records from [facility].  Continue all medication at this time.  We will try the Lidoderm patches to help limit her need for the pain medication.  She states she has used this in the past with some success.  Continue to follow up with Dr. Aj in Dr. Medina.

## 2020-01-16 NOTE — LETTER
Fax Transmission                                                                                                                                                       Date: January 16, 2020       To:   From: Dr. Freitas   Fax:  [phone] Fax:    Phone:   Phone:      Special Instructions:     Please send Swati Martin ([id number]) most recent office visit or notes. Thank you                             If there are any problems with this transmission, please call immediately. Thank you.    Confidentiality notice: The accompanying facsimile is intended solely for the use of the recipient designated above. Document(s) transmitted herewith may contain information that is confidential and privileged. Delivery, distribution or dissemination of this communication other than to the intended recipient is strictly prohibited. If you have received this facsimile in error, please notify [facility]'s Corporate Integrity Department immediately by telephone at [phone].

## 2022-04-30 NOTE — OP NOTE
DATE OF SURGERY:    08/09/2017    SURGEON:  MASOOD Zhao MD    PREOPERATIVE DIAGNOSIS:  Adenocarcinoma of the right lower lobe.    POSTOPERATIVE DIAGNOSIS:  Adenocarcinoma of the right lower lobe.    PROCEDURE:  Electromagnetic navigational bronchoscopy.    PROCEDURE IN DETAIL:  After informed consent was obtained, the patient was prepped in the usual fashion.  Anesthesia provided anesthetic services and inserted LMA.  Once she was adequately sedated, the Olympus bronchoscope was entered through LMA through the vocal cords which were normal pre and post bronchoscopy.  A complete and thorough bilateral endobronchial examination was then carried out beginning with the trachea, right upper lobe, right middle lobe and right lower lobe.  Photographs of these areas were attached.  She had some changes of chronic bronchitis, but no endobronchial lesions.  The scope was entered into the left mainstem, left upper lobe lingula and lower lobes.  Again, similar findings were encountered with no endobronchial lesions.       The locatable guide was then inserted into the bronchoscope and registration was carried out.  Once that was completed, we navigated to three distinct areas in the superior segment of the right lower lobe and dropped three fiducials under direct fluoroscopic guidance.  There was no evidence of pneumothorax and the patient tolerated the procedure very well.        ______________________________  G. Jarrett Zhao MD    GGG/CM  DD:  08/09/2017  Time:  11:32AM  DT:  08/09/2017  Time:  11:48AM  Job #:  [id number]    cc: Rigoberto Barroso MD - [place]